# Patient Record
(demographics unavailable — no encounter records)

---

## 2018-01-12 NOTE — PSYCH
Psych Med Mgmt    Appearance: was calm and cooperative, adequate hygiene and grooming and good eye contact  Observed mood: mood appropriate  Observed mood: affect appropriate  Speech: a normal rate and fluent  Thought processes: coherent/organized  Hallucinations: no hallucinations present  Thought Content: no delusions  Abnormal Thoughts: The patient has no suicidal thoughts and no homicidal thoughts  Orientation: The patient is oriented to person, place and time, oriented to person, oriented to place and oriented to time  Recent and Remote Memory: short term memory intact and long term memory intact  Attention Span And Concentration: concentration intact  Insight: Limited insight  Judgment: His judgment was limited  Muscle Strength And Tone  Muscle strength and tone were normal    The patient is experiencing no localized pain  Goals addressed in session: Medication Management     Treatment Recommendations: Continue current medications  Risks, Benefits And Possible Side Effects Of Medications: Risks, benefits, and possible side effects of medications explained to patient and patient verbalizes understanding  He reports normal appetite, normal energy level, no weight change and normal number of sleep hours  He was last seen October 2015   Stated that since last visit he lost 2 pets and he adopted a new dog that had behavior problems and attacked his daughter and he had to euthanize him  he is tearful as he tells me  Assessment    1  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)    Plan    1  Viagra 50 MG Oral Tablet; TAKE 1 TABLET DAILY 1 HOUR BEFORE NEEDED    2  LORazepam 1 MG Oral Tablet; take 1/2 tablet twice a day    Review of Systems    Constitutional: No fever, no chills, feels well, no tiredness, no recent weight gain or loss  Cardiovascular: no complaints of slow or fast heart rate, no chest pain, no palpitations     Respiratory: no complaints of shortness of breath, no wheezing, no dyspnea on exertion  Gastrointestinal: no complaints of abdominal pain, no constipation, no nausea, no diarrhea, no vomiting  Genitourinary: no complaints of dysuria, no incontinence, no pelvic pain, no urinary frequency  Musculoskeletal: no complaints of arthralgia, no myalgias, no limb pain, no joint stiffness  Integumentary: no complaints of skin rash, no itching, no dry skin  Neurological: no complaints of headache, no confusion, no numbness, no dizziness  Substance Abuse Hx    Substance Abuse History: Denies  Active Problems    1  Erectile dysfunction (607 84) (N52 9)   2  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)    Past Medical History    The active problems and past medical history were reviewed and updated today  Allergies    1  No Known Drug Allergies    Current Meds   1  LORazepam 1 MG Oral Tablet; take 1/2 tablet twice a day; Therapy: 07NLD0585 to (Evaluate:80Rqq4301)  Requested for: 14Apr2016; Last   Rx:[id number] Ordered   2  Naratriptan HCl - 2 5 MG Oral Tablet; Therapy: 11MEO3223 to Recorded   3  Viagra 50 MG Oral Tablet; TAKE 1 TABLET DAILY 1 HOUR BEFORE NEEDED; Therapy: 37SXE3046 to (Evaluate:11Jan2016); Last Rx:[id number] Ordered    The medication list was reviewed and updated today  Family Psych History  Family History    1  No pertinent family history    The family history was reviewed and updated today  Social History    · Never A Smoker  The social history was reviewed and updated today  The social history was reviewed and is unchanged  End of Encounter Meds    1  Viagra 50 MG Oral Tablet; TAKE 1 TABLET DAILY 1 HOUR BEFORE NEEDED; Therapy: 90PMI0742 to (Evaluate:98Fyf0442); Last Rx:[id number] Ordered    2  LORazepam 1 MG Oral Tablet; take 1/2 tablet twice a day;    Therapy: 39CBM3485 to (Evaluate:99Nin4580)  Requested for: 24ZMB6667; Last   Rx:[id number] Ordered    3  Naratriptan HCl - 2 5 MG Oral Tablet;    Therapy: 20XLD5024 to Recorded    Signatures   Electronically signed by : MIN Azar ; May 26 2016  4:29PM EST                       (Author)

## 2021-03-10 DIAGNOSIS — Z23 ENCOUNTER FOR IMMUNIZATION: ICD-10-CM
